# Patient Record
Sex: MALE | Race: OTHER | URBAN - METROPOLITAN AREA
[De-identification: names, ages, dates, MRNs, and addresses within clinical notes are randomized per-mention and may not be internally consistent; named-entity substitution may affect disease eponyms.]

---

## 2018-11-05 ENCOUNTER — EMERGENCY (EMERGENCY)
Facility: HOSPITAL | Age: 43
LOS: 0 days | Discharge: HOME | End: 2018-11-05
Attending: EMERGENCY MEDICINE
Payer: SELF-PAY

## 2018-11-05 VITALS
HEIGHT: 67 IN | WEIGHT: 163.14 LBS | RESPIRATION RATE: 16 BRPM | HEART RATE: 66 BPM | SYSTOLIC BLOOD PRESSURE: 128 MMHG | OXYGEN SATURATION: 97 % | TEMPERATURE: 98 F | DIASTOLIC BLOOD PRESSURE: 77 MMHG
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
## 2018-11-05 DIAGNOSIS — R20.2 PARESTHESIA OF SKIN: ICD-10-CM
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
## 2018-11-05 DIAGNOSIS — R42 DIZZINESS AND GIDDINESS: ICD-10-CM

## 2018-11-05 DIAGNOSIS — R00.2 PALPITATIONS: ICD-10-CM

## 2018-11-05 DIAGNOSIS — F41.0 PANIC DISORDER [EPISODIC PAROXYSMAL ANXIETY]: ICD-10-CM

## 2018-11-05 LAB
ALBUMIN SERPL ELPH-MCNC: 4.5 G/DL — SIGNIFICANT CHANGE UP (ref 3.3–5)
ALP SERPL-CCNC: 93 U/L — SIGNIFICANT CHANGE UP (ref 40–120)
ALT FLD-CCNC: 19 U/L — SIGNIFICANT CHANGE UP (ref 12–78)
ANION GAP SERPL CALC-SCNC: 7 MMOL/L — SIGNIFICANT CHANGE UP (ref 5–17)
APTT BLD: 37.5 SEC — HIGH (ref 27.5–36.3)
AST SERPL-CCNC: 17 U/L — SIGNIFICANT CHANGE UP (ref 15–37)
BILIRUB SERPL-MCNC: 0.5 MG/DL — SIGNIFICANT CHANGE UP (ref 0.2–1.2)
BUN SERPL-MCNC: 9 MG/DL — SIGNIFICANT CHANGE UP (ref 7–23)
CALCIUM SERPL-MCNC: 8.2 MG/DL — LOW (ref 8.5–10.1)
CHLORIDE SERPL-SCNC: 107 MMOL/L — SIGNIFICANT CHANGE UP (ref 96–108)
CO2 SERPL-SCNC: 27 MMOL/L — SIGNIFICANT CHANGE UP (ref 22–31)
CREAT SERPL-MCNC: 0.71 MG/DL — SIGNIFICANT CHANGE UP (ref 0.5–1.3)
ETHANOL SERPL-MCNC: <10 MG/DL — SIGNIFICANT CHANGE UP (ref 0–10)
GLUCOSE SERPL-MCNC: 101 MG/DL — HIGH (ref 70–99)
HCT VFR BLD CALC: 44.5 % — SIGNIFICANT CHANGE UP (ref 39–50)
HGB BLD-MCNC: 14.9 G/DL — SIGNIFICANT CHANGE UP (ref 13–17)
INR BLD: 1.03 RATIO — SIGNIFICANT CHANGE UP (ref 0.88–1.16)
MAGNESIUM SERPL-MCNC: 2.6 MG/DL — SIGNIFICANT CHANGE UP (ref 1.6–2.6)
MCHC RBC-ENTMCNC: 31.7 PG — SIGNIFICANT CHANGE UP (ref 27–34)
MCHC RBC-ENTMCNC: 33.5 GM/DL — SIGNIFICANT CHANGE UP (ref 32–36)
MCV RBC AUTO: 94.7 FL — SIGNIFICANT CHANGE UP (ref 80–100)
NRBC # BLD: 0 /100 WBCS — SIGNIFICANT CHANGE UP (ref 0–0)
PLATELET # BLD AUTO: 277 K/UL — SIGNIFICANT CHANGE UP (ref 150–400)
POTASSIUM SERPL-MCNC: 4.1 MMOL/L — SIGNIFICANT CHANGE UP (ref 3.5–5.3)
POTASSIUM SERPL-SCNC: 4.1 MMOL/L — SIGNIFICANT CHANGE UP (ref 3.5–5.3)
PROT SERPL-MCNC: 8.3 GM/DL — SIGNIFICANT CHANGE UP (ref 6–8.3)
PROTHROM AB SERPL-ACNC: 11.5 SEC — SIGNIFICANT CHANGE UP (ref 10–12.9)
RBC # BLD: 4.7 M/UL — SIGNIFICANT CHANGE UP (ref 4.2–5.8)
RBC # FLD: 13.4 % — SIGNIFICANT CHANGE UP (ref 10.3–14.5)
SODIUM SERPL-SCNC: 141 MMOL/L — SIGNIFICANT CHANGE UP (ref 135–145)
TROPONIN I SERPL-MCNC: <.015 NG/ML — SIGNIFICANT CHANGE UP (ref 0.01–0.04)
WBC # BLD: 7.77 K/UL — SIGNIFICANT CHANGE UP (ref 3.8–10.5)
WBC # FLD AUTO: 7.77 K/UL — SIGNIFICANT CHANGE UP (ref 3.8–10.5)

## 2018-11-05 PROCEDURE — 99285 EMERGENCY DEPT VISIT HI MDM: CPT

## 2018-11-05 PROCEDURE — 71045 X-RAY EXAM CHEST 1 VIEW: CPT | Mod: 26

## 2018-11-05 RX ORDER — SODIUM CHLORIDE 9 MG/ML
1000 INJECTION INTRAMUSCULAR; INTRAVENOUS; SUBCUTANEOUS ONCE
Qty: 0 | Refills: 0 | Status: COMPLETED | OUTPATIENT
Start: 2018-11-05 | End: 2018-11-05

## 2018-11-05 RX ORDER — ACETAMINOPHEN 500 MG
975 TABLET ORAL ONCE
Qty: 0 | Refills: 0 | Status: COMPLETED | OUTPATIENT
Start: 2018-11-05 | End: 2018-11-05

## 2018-11-05 RX ADMIN — Medication 975 MILLIGRAM(S): at 18:46

## 2018-11-05 RX ADMIN — SODIUM CHLORIDE 1000 MILLILITER(S): 9 INJECTION INTRAMUSCULAR; INTRAVENOUS; SUBCUTANEOUS at 18:00

## 2018-11-05 NOTE — ED PROVIDER NOTE - PROGRESS NOTE DETAILS
Results reported to patient--grossly benign, labs wnl  Pt. reports feeling better after meds  pt. agrees to f/u with primary care outpt.  pt. understands to return to ED if symptoms worsen; will d/c

## 2018-11-05 NOTE — ED PROVIDER NOTE - PHYSICAL EXAMINATION
Vitals: WNL  Gen: AAOx3, NAD, sitting comfortably in stretcher  Head: ncat, perrla, eomi b/l  Neck: supple, no lymphadenopathy, no midline deviation  Heart: rrr, no m/r/g  Lungs: CTA b/l, no rales/ronchi/wheezes  Abd: soft, nontender, non-distended, no rebound or guarding  Ext: no clubbing/cyanosis/edema  Neuro: sensation and muscle strength intact b/l, steady gait, CN2-12 intact b/l, no focal weakness

## 2018-11-05 NOTE — ED PROVIDER NOTE - MEDICAL DECISION MAKING DETAILS
44 yo M with likely panic attack, r/o other etiology, including cardiac  -basic albs, ua, cx, coags, trop, drug screen, iv line, ns hydration, tyelnol prn, cxr, ekg  -f/u results, reeval

## 2018-11-05 NOTE — ED ADULT TRIAGE NOTE - CHIEF COMPLAINT QUOTE
# [id number] Pt c/o palpitation with sob feeling anxious, denies chest pain c/o headache with dizziness and neck pain

## 2018-11-05 NOTE — ED ADULT NURSE NOTE - CHIEF COMPLAINT QUOTE
# [id number] Pt c/o palpitation with sob feeling anxious, denies chest pain c/o headache with dizziness and neck pain

## 2018-11-05 NOTE — ED PROVIDER NOTE - OBJECTIVE STATEMENT
44 yo M with chest tightness, palpitations, paresthesia, light-headedness.  Symptoms started while working  (pt. denies chemical exposure).  He denies stress or other inciting event.  He never had this before.  Symptoms are improved since when they first started, estimated 5 hours prior.  No other complaints.   ROS: negative for fever, cough, headache, shortness of breath, abd pain, nausea, vomiting, diarrhea, rash, and weakness--all other systems reviewed are negative.   PMH: negative; Meds: Denies; SH: Denies smoking/drinking/drug use Applaud used to communicate with patient:  44 yo M with chest tightness, palpitations, paresthesia, light-headedness.  Symptoms started while working  (pt. denies chemical exposure).  He denies stress or other inciting event.  He never had this before.  Symptoms are improved since when they first started, estimated 5 hours prior.  No other complaints.   ROS: negative for fever, cough, headache, shortness of breath, abd pain, nausea, vomiting, diarrhea, rash, and weakness--all other systems reviewed are negative.   PMH: negative; Meds: Denies; SH: Denies smoking/drinking/drug use

## 2018-11-05 NOTE — ED ADULT NURSE NOTE - OBJECTIVE STATEMENT
Pt is a 43YOM who is here complaining of palpitations. Pt states they have come and gone for a few days, not experiencing chest pain, just discomfort when the palpitations.

## 2023-10-25 NOTE — ED ADULT NURSE NOTE - PAIN RATING/NUMBER SCALE (0-10): REST
Daily Note     Today's date: 10/25/2023  Patient name: Cm Quintanilla  : 1952  MRN: [id number]  Referring provider: Shaista Nesbitt MD  Dx:   Encounter Diagnosis     ICD-10-CM    1. Stiffness of left hand, not elsewhere classified  M25.642                      Subjective: pt cont to use his hand more and more actively. Composite grasp is still limited at the [facility] and . Objective: See treatment diary below    Pt provided reema tape when not using his ROSALINA; ROSALINA for activity. AROM left wrist E/F- 60/65  Left SF MP- 0/90; PIP- 0/45; DIP- 0/30   Sensation- no tingling noted    Level II- 55/75; level III 50/80; 43 EULA- 10/16; Key- 20/20  Circumference at wrist- 17.5 cm; MPs- 22.5cm  Pt instructed in AAROM/partial fisting, isolated AROM at PIP/DIP  Pt also instructed scar management    Assessment: Tolerated treatment well. Patient would benefit from continued PT      Plan: Continue per plan of care. Precautions: wear orthosis as directed.   No aggressive activity with left hand    Manuals 10/23 10/25            STM MF/SF 10 10 10 10 15 10 10 10  10                             PROM 2/10 2/10 2/10 2/10 2/10 2/10 2/10 2/10  2/10     FDS 2/10 2/10 2/10 2/10 2/10 2/10 2/10 2/10  2/10     FDP 2/10 2/10 2/10 2/10 2/10 2/10 2/10 2/10  2/10                             IASTM  8  8 8 8  8  8 8  8  8     Neuro Re-Ed                       HP/[place] F/ball 15 15 15 15 15 15 15 15  15                                                     Ther Ex                       TGE                              TP   T 2'  T 2'  T 2' T 2'  T 2'  T 2'  T 2'  T 2'  T 2'     TP scrape  T 1'  T 1'  T 1;'  T 1'  T 2/10   T 2/10  T 2' T 2'  T 1     TP FDS  T 2/10  T 2/10  T 2/10  T 2/10  T 2/10  T 2/10  T 2/10  T 2'  T 2/10     Glen  25 2/10  25 2/10  25 2/10  25 2/10  25 2/10  25 2/10  25 2/10  25 2/10  25 2/10     Blue  L III 2/10  L III 2/10  L III 2/10  L III 2/10  L III 2/10  L III 2/10  L III 2/10 L III  2/10  L III 2/10     Flexbar   G 20/20  G 20/20  R 20/20  G 20/20  G 20/20  G 20/20  G 20/20 G 20/20   G 20/20     DB E/F w/buildup  4 2/10  4 2/10  5  4 2/10  4 2/10 4 2/10  4 2/10  4 2/10  4 2/10           S/P  w/buildup  2 2/10  2 2/10    2 2/10  2 2/10  2 2/10  2 2/10  2 2/10  2 2/10      FF/Scapt  6 2/10  6 2/10    6 2/10  6 2/10  6 2/10  6 2/10  6 2/10  6 2/10     OH tricep  4 2/10  4 2/10  5  4 2/10  4 2/10  4 2/10  4 2/10  4 2/10  4 2/10                             Modalities                       US 12 12 12 12 12 12 12 12  12 2